# Patient Record
Sex: FEMALE | URBAN - METROPOLITAN AREA
[De-identification: names, ages, dates, MRNs, and addresses within clinical notes are randomized per-mention and may not be internally consistent; named-entity substitution may affect disease eponyms.]

---

## 2024-04-23 ENCOUNTER — OV CON (OUTPATIENT)
Dept: URBAN - METROPOLITAN AREA CLINIC 115 | Facility: CLINIC | Age: 29
End: 2024-04-23
Payer: COMMERCIAL

## 2024-04-23 ENCOUNTER — LAB (OUTPATIENT)
Dept: URBAN - METROPOLITAN AREA CLINIC 115 | Facility: CLINIC | Age: 29
End: 2024-04-23

## 2024-04-23 VITALS
HEART RATE: 91 BPM | TEMPERATURE: 98 F | BODY MASS INDEX: 28.68 KG/M2 | DIASTOLIC BLOOD PRESSURE: 83 MMHG | SYSTOLIC BLOOD PRESSURE: 131 MMHG | WEIGHT: 193.6 LBS | HEIGHT: 69 IN

## 2024-04-23 DIAGNOSIS — R10.13 EPIGASTRIC ABDOMINAL PAIN: ICD-10-CM

## 2024-04-23 DIAGNOSIS — A04.8 OTHER SPECIFIED BACTERIAL INTESTINAL INFECTIONS: ICD-10-CM

## 2024-04-23 DIAGNOSIS — K64.4 HEMORRHOIDS, EXTERNAL: ICD-10-CM

## 2024-04-23 DIAGNOSIS — R10.811 RIGHT UPPER QUADRANT ABDOMINAL TENDERNESS WITHOUT REBOUND TENDERNESS: ICD-10-CM

## 2024-04-23 PROBLEM — 162031009: Status: ACTIVE | Noted: 2024-04-23

## 2024-04-23 PROBLEM — 721730009: Status: ACTIVE | Noted: 2024-04-23

## 2024-04-23 PROBLEM — 79922009: Status: ACTIVE | Noted: 2024-04-23

## 2024-04-23 PROBLEM — 116289008: Status: ACTIVE | Noted: 2024-04-23

## 2024-04-23 PROBLEM — 448265002: Status: ACTIVE | Noted: 2024-04-23

## 2024-04-23 PROBLEM — 23913003: Status: ACTIVE | Noted: 2024-04-23

## 2024-04-23 PROCEDURE — 99203 OFFICE O/P NEW LOW 30 MIN: CPT | Performed by: INTERNAL MEDICINE

## 2024-04-23 RX ORDER — CLARITHROMYCIN 500 MG/1
TAKE ONE TABLET BY MOUTH TWICE A DAY TABLET, FILM COATED ORAL
Qty: 28 UNSPECIFIED | Refills: 0 | Status: ACTIVE | COMMUNITY

## 2024-04-23 RX ORDER — METFORMIN HCL 500 MG/1
TAKE ONE TABLET BY MOUTH ONE TIME DAILY TABLET, FILM COATED ORAL
Qty: 90 UNSPECIFIED | Refills: 0 | Status: ACTIVE | COMMUNITY

## 2024-04-23 RX ORDER — OMEPRAZOLE 20 MG/1
1 CAPSULE 30 MINUTES BEFORE MORNING MEAL CAPSULE, DELAYED RELEASE ORAL ONCE A DAY
Status: ACTIVE | COMMUNITY

## 2024-04-23 RX ORDER — AMOXICILLIN 500 MG/1
TAKE TWO TABLETS BY MOUTH TWICE A DAY TABLET, FILM COATED ORAL
Qty: 56 UNSPECIFIED | Refills: 0 | Status: ACTIVE | COMMUNITY

## 2024-04-23 RX ORDER — HYDROCORTISONE 2.5% 25 MG/G
1 APPLICATION CREAM TOPICAL TWICE A DAY
Qty: 60 GRAM | Refills: 0 | OUTPATIENT
Start: 2024-04-23 | End: 2024-06-22

## 2024-04-23 NOTE — HPI-TODAY'S VISIT:
29 y/o  female from [facility] with obesity that came for eval given epigastric abdominal pain,tenderness,bloating and indigestion for the last few months with intermittent rectal bleeding few episodes w/o weight loss,she had hemorrhoids.Denies N/D/V,fever,night sweats. Triple therapy was prescribed by PCP after +UBT but amoxi dose was not enought but she complete therapy for 10 days so given pain and tenderness will check for gallstones and schedule EGD for biopsies and confirm eradication of h pylori.Advice to increase fiber on diet and topical care for hemorrhoids.IF symptoms persist will consider colonoscopy

## 2024-04-24 LAB
TISSUE TRANSGLUTAMINASE AB, IGA: <1
TISSUE TRANSGLUTAMINASE AB, IGG: <1

## 2024-05-16 ENCOUNTER — OFFICE VISIT (OUTPATIENT)
Dept: URBAN - METROPOLITAN AREA CLINIC 114 | Facility: CLINIC | Age: 29
End: 2024-05-16
Payer: COMMERCIAL

## 2024-05-16 DIAGNOSIS — K80.20 CHOLELITHIASIS: ICD-10-CM

## 2024-05-16 DIAGNOSIS — K76.0 FATTY (CHANGE OF) LIVER: ICD-10-CM

## 2024-05-16 PROCEDURE — 76705 ECHO EXAM OF ABDOMEN: CPT | Performed by: INTERNAL MEDICINE

## 2024-05-29 ENCOUNTER — OUT OF OFFICE VISIT (OUTPATIENT)
Dept: URBAN - METROPOLITAN AREA SURGERY CENTER 13 | Facility: SURGERY CENTER | Age: 29
End: 2024-05-29

## 2024-05-29 ENCOUNTER — TELEPHONE ENCOUNTER (OUTPATIENT)
Dept: URBAN - METROPOLITAN AREA CLINIC 82 | Facility: CLINIC | Age: 29
End: 2024-05-29

## 2024-05-29 PROBLEM — 235919008: Status: ACTIVE | Noted: 2024-05-29

## 2024-05-29 RX ORDER — HYDROCORTISONE 2.5% 25 MG/G
1 APPLICATION CREAM TOPICAL TWICE A DAY
Qty: 60 GRAM | Refills: 0 | Status: ACTIVE | COMMUNITY
Start: 2024-04-23 | End: 2024-06-22

## 2024-05-29 RX ORDER — AMOXICILLIN 500 MG/1
TAKE TWO TABLETS BY MOUTH TWICE A DAY TABLET, FILM COATED ORAL
Qty: 56 UNSPECIFIED | Refills: 0 | Status: ACTIVE | COMMUNITY

## 2024-05-29 RX ORDER — OMEPRAZOLE 20 MG/1
1 CAPSULE 30 MINUTES BEFORE MORNING MEAL CAPSULE, DELAYED RELEASE ORAL ONCE A DAY
Status: ACTIVE | COMMUNITY

## 2024-05-29 RX ORDER — CLARITHROMYCIN 500 MG/1
TAKE ONE TABLET BY MOUTH TWICE A DAY TABLET, FILM COATED ORAL
Qty: 28 UNSPECIFIED | Refills: 0 | Status: ACTIVE | COMMUNITY

## 2024-05-29 RX ORDER — METFORMIN HCL 500 MG/1
TAKE ONE TABLET BY MOUTH ONE TIME DAILY TABLET, FILM COATED ORAL
Qty: 90 UNSPECIFIED | Refills: 0 | Status: ACTIVE | COMMUNITY

## 2024-07-09 ENCOUNTER — OFFICE VISIT (OUTPATIENT)
Dept: URBAN - METROPOLITAN AREA CLINIC 115 | Facility: CLINIC | Age: 29
End: 2024-07-09

## 2024-08-13 ENCOUNTER — OFFICE VISIT (OUTPATIENT)
Dept: URBAN - METROPOLITAN AREA CLINIC 115 | Facility: CLINIC | Age: 29
End: 2024-08-13
Payer: COMMERCIAL

## 2024-08-13 VITALS
HEART RATE: 72 BPM | BODY MASS INDEX: 27.67 KG/M2 | SYSTOLIC BLOOD PRESSURE: 108 MMHG | TEMPERATURE: 98.6 F | HEIGHT: 69 IN | DIASTOLIC BLOOD PRESSURE: 65 MMHG | WEIGHT: 186.8 LBS

## 2024-08-13 DIAGNOSIS — R14.0 ABDOMINAL BLOATING: ICD-10-CM

## 2024-08-13 DIAGNOSIS — K80.20 GALLSTONES: ICD-10-CM

## 2024-08-13 PROBLEM — 196735001: Status: ACTIVE | Noted: 2024-08-13

## 2024-08-13 PROCEDURE — 99213 OFFICE O/P EST LOW 20 MIN: CPT | Performed by: INTERNAL MEDICINE

## 2024-08-13 RX ORDER — CLARITHROMYCIN 500 MG/1
TAKE ONE TABLET BY MOUTH TWICE A DAY TABLET, FILM COATED ORAL
Qty: 28 UNSPECIFIED | Refills: 0 | Status: ON HOLD | COMMUNITY

## 2024-08-13 RX ORDER — OMEPRAZOLE 20 MG/1
1 CAPSULE 30 MINUTES BEFORE MORNING MEAL CAPSULE, DELAYED RELEASE ORAL ONCE A DAY
Status: ON HOLD | COMMUNITY

## 2024-08-13 RX ORDER — METFORMIN HCL 500 MG/1
TAKE ONE TABLET BY MOUTH ONE TIME DAILY TABLET, FILM COATED ORAL
Qty: 90 UNSPECIFIED | Refills: 0 | Status: ACTIVE | COMMUNITY

## 2024-08-13 RX ORDER — AMOXICILLIN 500 MG/1
TAKE TWO TABLETS BY MOUTH TWICE A DAY TABLET, FILM COATED ORAL
Qty: 56 UNSPECIFIED | Refills: 0 | Status: ON HOLD | COMMUNITY

## 2024-08-13 NOTE — HPI-TODAY'S VISIT:
Egd w/o H pylori infection,mild chemical gastritis. U/S with gallstones and she is on schedule for surgery with Dr. Jang on 9/2024. Weight loss and low fat diet recommended.

## 2024-08-14 ENCOUNTER — DASHBOARD ENCOUNTERS (OUTPATIENT)
Age: 29
End: 2024-08-14